# Patient Record
Sex: FEMALE | Race: ASIAN | NOT HISPANIC OR LATINO | URBAN - METROPOLITAN AREA
[De-identification: names, ages, dates, MRNs, and addresses within clinical notes are randomized per-mention and may not be internally consistent; named-entity substitution may affect disease eponyms.]

---

## 2018-01-10 ENCOUNTER — COMMUNICATION - HEALTHEAST (OUTPATIENT)
Dept: FAMILY MEDICINE | Facility: CLINIC | Age: 18
End: 2018-01-10

## 2018-01-10 DIAGNOSIS — Z20.9 EXPOSURE TO POTENTIAL INFECTION: ICD-10-CM

## 2018-01-16 ENCOUNTER — AMBULATORY - HEALTHEAST (OUTPATIENT)
Dept: NURSING | Facility: CLINIC | Age: 18
End: 2018-01-16

## 2018-01-18 ENCOUNTER — AMBULATORY - HEALTHEAST (OUTPATIENT)
Dept: NURSING | Facility: CLINIC | Age: 18
End: 2018-01-18

## 2018-01-18 LAB
INDURATION - HISTORICAL: 0 MM
TB SKIN TEST - HISTORICAL: NEGATIVE

## 2021-03-22 ENCOUNTER — OFFICE VISIT - HEALTHEAST (OUTPATIENT)
Dept: FAMILY MEDICINE | Facility: CLINIC | Age: 21
End: 2021-03-22

## 2021-03-22 DIAGNOSIS — I45.10 INCOMPLETE RIGHT BUNDLE BRANCH BLOCK (RBBB): ICD-10-CM

## 2021-03-22 DIAGNOSIS — Z00.00 ANNUAL PHYSICAL EXAM: ICD-10-CM

## 2021-03-22 DIAGNOSIS — R55 SYNCOPE, UNSPECIFIED SYNCOPE TYPE: ICD-10-CM

## 2021-03-22 LAB
ATRIAL RATE - MUSE: 67 BPM
DIASTOLIC BLOOD PRESSURE - MUSE: NORMAL
ERYTHROCYTE [DISTWIDTH] IN BLOOD BY AUTOMATED COUNT: 15.8 % (ref 11–14.5)
HBA1C MFR BLD: 5.5 %
HCT VFR BLD AUTO: 33.4 % (ref 35–47)
HGB BLD-MCNC: 10.4 G/DL (ref 12–16)
INTERPRETATION ECG - MUSE: NORMAL
MCH RBC QN AUTO: 23 PG (ref 27–34)
MCHC RBC AUTO-ENTMCNC: 31.1 G/DL (ref 32–36)
MCV RBC AUTO: 74 FL (ref 80–100)
P AXIS - MUSE: 44 DEGREES
PLATELET # BLD AUTO: 403 THOU/UL (ref 140–440)
PMV BLD AUTO: 10 FL (ref 7–10)
PR INTERVAL - MUSE: 132 MS
QRS DURATION - MUSE: 106 MS
QT - MUSE: 402 MS
QTC - MUSE: 424 MS
R AXIS - MUSE: 78 DEGREES
RBC # BLD AUTO: 4.53 MILL/UL (ref 3.8–5.4)
SYSTOLIC BLOOD PRESSURE - MUSE: NORMAL
T AXIS - MUSE: 19 DEGREES
VENTRICULAR RATE- MUSE: 67 BPM
WBC: 6.9 THOU/UL (ref 4–11)

## 2021-03-22 ASSESSMENT — MIFFLIN-ST. JEOR: SCORE: 1077.82

## 2021-06-05 VITALS
HEIGHT: 57 IN | OXYGEN SATURATION: 98 % | WEIGHT: 99.5 LBS | DIASTOLIC BLOOD PRESSURE: 62 MMHG | BODY MASS INDEX: 21.46 KG/M2 | SYSTOLIC BLOOD PRESSURE: 88 MMHG | HEART RATE: 79 BPM

## 2021-06-16 PROBLEM — I45.10 INCOMPLETE RIGHT BUNDLE BRANCH BLOCK (RBBB): Status: ACTIVE | Noted: 2021-03-22

## 2021-06-16 NOTE — PROGRESS NOTES
"  Subjective:    Shanelle Mason is a 20 y.o. female who presents with chief complaint of physical exam.  Other concerns:  1.  Syncope.  She has fainted twice in the past several years.  Last time was last Friday, 3/19/2021.  The last 1 prior to that was probably a year or 2 ago.  When she fainted recently, she did feel funny before she fainted.  She feels like she was eating and drinking normallyi Normal periods.  No family history of heart problems at a young age.  Mom dad and 4 brothers are healthy as far she knows.  No family history of cancer.  She is not using anything for birth control.  She declines offer of birth control.  She also declines prenatal vitamin.  Sleep is okay.  She declined Pap smear and Chlamydia screening today.  History of surgery for trigger thumb.    Patient Active Problem List   Diagnosis     Anemia     Anxiety     Iron deficiency     No current outpatient medications on file.     Objective:   Allergies:  Patient has no known allergies.    Vitals:  Vitals:    03/22/21 0944   BP: (!) 88/62   Patient Site: Left Arm   Patient Position: Sitting   Cuff Size: Adult Regular   Pulse: 79   SpO2: 98%   Weight: 99 lb 8 oz (45.1 kg)   Height: 4' 8.54\" (1.436 m)     Body mass index is 21.89 kg/m .    Vital signs reviewed.  Cardiac: regular rate and rhythm, no murmurs  Pulmonary: lungs clear to auscultation bilaterally, no crackles, rales, rhonchi, or wheezing noted    Results for orders placed or performed in visit on 03/22/21   HM2(CBC w/o Differential)   Result Value Ref Range    WBC 6.9 4.0 - 11.0 thou/uL    RBC 4.53 3.80 - 5.40 mill/uL    Hemoglobin 10.4 (L) 12.0 - 16.0 g/dL    Hematocrit 33.4 (L) 35.0 - 47.0 %    MCV 74 (L) 80 - 100 fL    MCH 23.0 (L) 27.0 - 34.0 pg    MCHC 31.1 (L) 32.0 - 36.0 g/dL    RDW 15.8 (H) 11.0 - 14.5 %    Platelets 403 140 - 440 thou/uL    MPV 10.0 7.0 - 10.0 fL   Glycosylated Hemoglobin A1c   Result Value Ref Range    Hemoglobin A1c 5.5 <=5.6 %   Electrocardiogram " Perform and Read   Result Value Ref Range    SYSTOLIC BLOOD PRESSURE      DIASTOLIC BLOOD PRESSURE      VENTRICULAR RATE 67 BPM    ATRIAL RATE 67 BPM    P-R INTERVAL 132 ms    QRS DURATION 106 ms    Q-T INTERVAL 402 ms    QTC CALCULATION (BEZET) 424 ms    P Axis 44 degrees    R AXIS 78 degrees    T AXIS 19 degrees    MUSE DIAGNOSIS       Normal sinus rhythm with sinus arrhythmia  Incomplete right bundle branch block  Borderline ECG  No previous ECGs available  Confirmed by DEBBIE TESFAYE MD LOC:[facility] ([id number]) on 3/22/2021 11:26:04 AM     I personally reviewed EKG today, sinus arrhythmia as reported above.    Assessment and Plan:   1. Annual physical exam  Health maintenance discussed with patient is appropriate for age and risk factors.  She declines STD screening today.  She declines Pap smear today.    2. Syncope, unspecified syncope type  Only one episode in the past several years.  Unclear etiology.  Vitals, exam are reassuring.  No red flags on exam or history.  I will follow-up with pending labs.  If everything is normal, could consider referral to cardiology, monitoring, and/or cardiac echo.  I reviewed other symptomatic treatment with her.  - HM2(CBC w/o Differential)  - Glycosylated Hemoglobin A1c  - Electrocardiogram Perform and Read    3. Incomplete RBBB  Unclear if related to syncope.  Will refer to cardiology for further evaluation.    This dictation uses voice recognition software, which may contain typographical errors.

## 2021-06-27 ENCOUNTER — HEALTH MAINTENANCE LETTER (OUTPATIENT)
Age: 21
End: 2021-06-27

## 2021-10-17 ENCOUNTER — HEALTH MAINTENANCE LETTER (OUTPATIENT)
Age: 21
End: 2021-10-17

## 2022-05-29 ENCOUNTER — HEALTH MAINTENANCE LETTER (OUTPATIENT)
Age: 22
End: 2022-05-29

## 2022-10-02 ENCOUNTER — HEALTH MAINTENANCE LETTER (OUTPATIENT)
Age: 22
End: 2022-10-02

## 2023-06-04 ENCOUNTER — HEALTH MAINTENANCE LETTER (OUTPATIENT)
Age: 23
End: 2023-06-04